# Patient Record
Sex: MALE | Race: WHITE | NOT HISPANIC OR LATINO | Employment: FULL TIME | URBAN - METROPOLITAN AREA
[De-identification: names, ages, dates, MRNs, and addresses within clinical notes are randomized per-mention and may not be internally consistent; named-entity substitution may affect disease eponyms.]

---

## 2020-09-02 ENCOUNTER — OFFICE VISIT (OUTPATIENT)
Dept: OBGYN CLINIC | Facility: CLINIC | Age: 30
End: 2020-09-02
Payer: COMMERCIAL

## 2020-09-02 ENCOUNTER — APPOINTMENT (OUTPATIENT)
Dept: RADIOLOGY | Facility: CLINIC | Age: 30
End: 2020-09-02
Payer: COMMERCIAL

## 2020-09-02 VITALS
WEIGHT: 224.6 LBS | SYSTOLIC BLOOD PRESSURE: 130 MMHG | HEIGHT: 68 IN | BODY MASS INDEX: 34.04 KG/M2 | TEMPERATURE: 96.9 F | DIASTOLIC BLOOD PRESSURE: 77 MMHG | HEART RATE: 83 BPM

## 2020-09-02 DIAGNOSIS — M25.511 RIGHT SHOULDER PAIN, UNSPECIFIED CHRONICITY: ICD-10-CM

## 2020-09-02 DIAGNOSIS — S46.011A ROTATOR CUFF STRAIN, RIGHT, INITIAL ENCOUNTER: Primary | ICD-10-CM

## 2020-09-02 PROCEDURE — 99204 OFFICE O/P NEW MOD 45 MIN: CPT | Performed by: ORTHOPAEDIC SURGERY

## 2020-09-02 PROCEDURE — 73030 X-RAY EXAM OF SHOULDER: CPT

## 2020-09-02 RX ORDER — IBUPROFEN 200 MG
TABLET ORAL EVERY 6 HOURS PRN
COMMUNITY
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
## 2020-09-15 ENCOUNTER — EVALUATION (OUTPATIENT)
Dept: PHYSICAL THERAPY | Facility: CLINIC | Age: 30
End: 2020-09-15
Payer: COMMERCIAL

## 2020-09-15 DIAGNOSIS — S46.011A ROTATOR CUFF STRAIN, RIGHT, INITIAL ENCOUNTER: ICD-10-CM

## 2020-09-15 PROCEDURE — 97161 PT EVAL LOW COMPLEX 20 MIN: CPT
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
## 2020-09-22 ENCOUNTER — OFFICE VISIT (OUTPATIENT)
Dept: PHYSICAL THERAPY | Facility: CLINIC | Age: 30
End: 2020-09-22
Payer: COMMERCIAL

## 2020-09-22 DIAGNOSIS — S46.011A ROTATOR CUFF STRAIN, RIGHT, INITIAL ENCOUNTER: Primary | ICD-10-CM

## 2020-09-22 PROCEDURE — 97110 THERAPEUTIC EXERCISES: CPT

## 2020-09-22 PROCEDURE — 97112 NEUROMUSCULAR REEDUCATION: CPT

## 2020-09-22 NOTE — PROGRESS NOTES
Daily Note      Today's date: 2020  Patient name: Sampson Suarez  : 1990  MRN: [id number]  Referring provider: Al Fraser MD  Dx:   Encounter Diagnosis     ICD-10-CM    1  Rotator cuff strain, right, initial encounter  S46 011A        Subjective: Pt reports that his right shoulder is improving  Pt states that at first he had slightly more pain with exercises  Pt states that after a while, his pain began to decrease  Objective: See treatment diary below    Assessment: Pt tolerated treatment well  Pt presented with good carryover of initial HEP and had no further questions  Pt presented with reported slight increase in discomfort with right shoulder flexion towel slide and no discomfort with abduction  Pt introduced to additional strengthening exercises including shoulder extensions, scaption, and shoulder abduction with theraband  Pt educated to include shoulder abduction, extension, and low doorway stretch in HEP  Pt also stated that he still has stiffness/discomfort in his right wrist from the fall and was educated in basic wrist flexibility and strengthening as HEP  Plan: Continue per plan of care  Progress treatment as tolerated  Precautions: Standard       EVAL 2 3 4 5   Manuals 9/15 9/22      STM/MFR R shoulder        PROM R shoulder         Joint mobs R shoulder        Neuro Re-Ed        Rows Instructed blue 3x10 (5s) green tubing      Shoulder extensions  2x10 (5s) green tubing       B/L ER Instructed green  2x10 (5s) green       Scaption   3x10 3#                              Ther Ex        Wall slides flex, abd Instructed 15x5s ea         Shoulder flexion wall slide w/ B/L ER iso   20x5s red       Low doorway stretch  5x15s       Shoulder abd w/ TB  20x3s black tubing                                       Ther Activity        Pt education POC, HEP Updated HEP                                                       Modalities

## 2020-09-24 ENCOUNTER — OFFICE VISIT (OUTPATIENT)
Dept: PHYSICAL THERAPY | Facility: CLINIC | Age: 30
End: 2020-09-24
Payer: COMMERCIAL

## 2020-09-24 DIAGNOSIS — S46.011A ROTATOR CUFF STRAIN, RIGHT, INITIAL ENCOUNTER: Primary | ICD-10-CM

## 2020-09-24 PROCEDURE — 97140 MANUAL THERAPY 1/> REGIONS: CPT

## 2020-09-24 PROCEDURE — 97110 THERAPEUTIC EXERCISES: CPT

## 2020-09-24 PROCEDURE — 97112 NEUROMUSCULAR REEDUCATION: CPT

## 2020-09-24 NOTE — PROGRESS NOTES
Daily Note      Today's date: 2020  Patient name: Richmond Beckford  : 1990  MRN: [id number]  Referring provider: Frandy Leos MD  Dx:   Encounter Diagnosis     ICD-10-CM    1  Rotator cuff strain, right, initial encounter  S46 011A        Subjective: Pt reports that he had an increase in right shoulder pain while working  Pt states while holding onto a stable object he sneezed and felt a sudden increase in pain  Objective: See treatment diary below    Assessment: Pt tolerated treatment fair  Pt noted mild increase in right shoulder pain with shoulder flexion wall slides and moderate increase with bilateral horizontal abduction, which was introduced in today's session  Pt also introduced to alternating isos IR/ER with PT and tolerated well  Pt noted pain in R shoulder with supine shoulder flexion AROM  Educated pt on potential benefits and purpose of HVLAT mobilization to thoracic spine  Obtained pt consent prior to performing mobilization  Pt noted no pain during shoulder flexion AROM in supine afterward  Plan: Continue per plan of care  Progress treatment as tolerated  Precautions: Standard       EVAL 2 3 4 5   Manuals 9/15 9/22 9/24     STM/MFR R shoulder        PROM R shoulder    In all planes      Joint mobs R shoulder   Gr V mid thoracic P-A, gr III-IV GH post, inf glide     Neuro Re-Ed        Rows Instructed blue 3x10 (5s) green tubing 2x10 (5s) green tubing      Shoulder extensions  2x10 (5s) green tubing  2x10 (5s) green tubing      B/L ER Instructed green  2x10 (5s) green  2x10 (5s) green      B/L horizontal abd   2x10 (3s)  green     Scaption   3x10 3# 2x10 black single tubing     Alternating isos ER/IR    At 45° abd  10x ea  Ther Ex        Wall slides flex, abd Instructed 15x5s ea  15x5s ea        Shoulder flexion wall slide w/ B/L ER iso   20x5s red  20x5s red     Low doorway stretch  5x15s  5x15s     Shoulder abd w/ TB  20x3s black tubing 20x3s black tubing                                      Ther Activity        Pt education POC, HEP Updated HEP  Reviewed HEP                                                     Modalities

## 2020-09-28 ENCOUNTER — OFFICE VISIT (OUTPATIENT)
Dept: PHYSICAL THERAPY | Facility: CLINIC | Age: 30
End: 2020-09-28
Payer: COMMERCIAL

## 2020-09-28 DIAGNOSIS — S46.011A ROTATOR CUFF STRAIN, RIGHT, INITIAL ENCOUNTER: Primary | ICD-10-CM

## 2020-09-28 PROCEDURE — 97112 NEUROMUSCULAR REEDUCATION: CPT

## 2020-09-28 PROCEDURE — 97110 THERAPEUTIC EXERCISES: CPT

## 2020-09-28 PROCEDURE — 97140 MANUAL THERAPY 1/> REGIONS: CPT

## 2020-09-28 NOTE — PROGRESS NOTES
Daily Note      Today's date: 2020  Patient name: Yassine Kan  : 1990  MRN: [id number]  Referring provider: Obed Shirley MD  Dx:   Encounter Diagnosis     ICD-10-CM    1  Rotator cuff strain, right, initial encounter  S46 011A        Subjective: Pt reports that over this past weekend his right shoulder has felt much better  Pt states that he also had 3 consecutive days off from work  Pt states he would like to continue PT for about one more week after this week in order to continue exercises and progress toward back to "normal" prior to shoulder injury  Objective: See treatment diary below    Assessment: Pt tolerated treatment well  Pt previously benefitted from grade 5 thoracic P-A mobilization to improve R shoulder AROM and pain  Pt was introduced to seated thoracic extension and noted relief of upper back stiffness  Pt educated in the correlation between upper back mobility and R shoulder mobility  Pt also progressed to shoulder IR/ER at 90° abduction and tolerated well  Pt would benefit from continued PT in order to compile comprehensive HEP  Discharge within ~3-4 visits is a realistic goal barring any sudden change in R shoulder pain/function  Plan: Continue per plan of care  Progress treatment as tolerated  Precautions: Standard       EVAL 2 3 4 5   Manuals 9/15 9/22 9/24 9/28    STM/MFR R shoulder        PROM R shoulder    In all planes  In all planes    Joint mobs R shoulder   Gr V mid thoracic P-A, gr III-IV GH post, inf glide     Neuro Re-Ed        Rows Instructed blue 3x10 (5s) green tubing 2x10 (5s) green tubing  20x5s black TB    Shoulder extensions  2x10 (5s) green tubing  2x10 (5s) green tubing  20x5s black    B/L ER Instructed green  2x10 (5s) green  2x10 (5s) green  20x3s green    B/L horizontal abd   2x10 (3s)  green 20x3s green    Scaption   3x10 3# 2x10 black single tubing 2x10 5#    Alternating isos ER/IR    At 45° abd  10x ea    Shoulder IR/ER at 90° RTB 20x ea  Ther Ex        Wall slides flex, abd Instructed 15x5s ea  15x5s ea  15x5s ea       Shoulder flexion wall slide w/ B/L ER iso   20x5s red  20x5s red     Low doorway stretch  5x15s  5x15s 5x15s     Shoulder abd w/ TB  20x3s black tubing  20x3s black tubing  20x3s black TB     Seated thoracic ext     20x w/ FR                            Ther Activity        Pt education POC, HEP Updated HEP  Reviewed HEP Progressing toward D/C                                                    Modalities

## 2020-09-30 ENCOUNTER — OFFICE VISIT (OUTPATIENT)
Dept: PHYSICAL THERAPY | Facility: CLINIC | Age: 30
End: 2020-09-30
Payer: COMMERCIAL

## 2020-09-30 DIAGNOSIS — S46.011A ROTATOR CUFF STRAIN, RIGHT, INITIAL ENCOUNTER: Primary | ICD-10-CM

## 2020-09-30 PROCEDURE — 97112 NEUROMUSCULAR REEDUCATION: CPT

## 2020-09-30 PROCEDURE — 97140 MANUAL THERAPY 1/> REGIONS: CPT

## 2020-09-30 PROCEDURE — 97110 THERAPEUTIC EXERCISES: CPT

## 2020-09-30 NOTE — PROGRESS NOTES
Daily Note     Today's date: 2020  Patient name: Day Wood  : 1990  MRN: [id number]  Referring provider: Singh Keita MD  Dx:   Encounter Diagnosis     ICD-10-CM    1  Rotator cuff strain, right, initial encounter  S46 011A        Start Time: 1740  Stop Time:   Total time in clinic (min): 45 minutes    Subjective: Patient states his right shldr feels pretty good today; occ has pain lissette with reaching overhead ( works as a  )       Objective: See treatment diary below      Assessment: Tolerated treatment well  Performs therex with good form; denied pain with strengthening exercises including scaption w 5# & abduction w black tband; patient reported  discomfort with doorway stretch - cueing to avoid overstretch  Patient would benefit from continued PT      Plan: Continue per plan of care  Patient to trial ice at home      Precautions: Standard       EVAL 2 3 4 5   Manuals 9/15 9/22 9/24 9/28    STM/MFR R shoulder        PROM R shoulder    In all planes  In all planes In all planes    Joint mobs R shoulder   Gr V mid thoracic P-A, gr III-IV GH post, inf glide  Not performed    Neuro Re-Ed        Rows Instructed blue 3x10 (5s) green tubing 2x10 (5s) green tubing  20x5s black TB 20x 5s black    Shoulder extensions  2x10 (5s) green tubing  2x10 (5s) green tubing  20x5s black 20x5s black    B/L ER Instructed green  2x10 (5s) green  2x10 (5s) green  20x3s green 2x10 3s  green   B/L horizontal abd   2x10 (3s)  green 20x3s green 20 x 3s green    Scaption   3x10 3# 2x10 black single tubing 2x10 5# 2 x 10 5#    Alternating isos ER/IR    At 45° abd  10x ea  Shoulder IR/ER at 90° RTB 20x ea  shldr IR/ER @ 90 RTB 20x ea                    Ther Ex        Wall slides flex, abd Instructed 15x5s ea  15x5s ea  15x5s ea    15 x 5s ea   Shoulder flexion wall slide w/ B/L ER iso   20x5s red  20x5s red     Low doorway stretch  5x15s  5x15s 5x15s  5 x 15 s    Shoulder abd w/ TB  20x3s black tubing  20x3s black tubing  20x3s black TB  20 x 3s black    Seated thoracic ext     20x w/ FR 20x w FR                            Ther Activity        Pt education POC, HEP Updated HEP  Reviewed HEP Progressing toward D/C Progressing toward D/C per primary PT                                                 Trial ice at home    Modalities

## 2020-10-06 ENCOUNTER — OFFICE VISIT (OUTPATIENT)
Dept: PHYSICAL THERAPY | Facility: CLINIC | Age: 30
End: 2020-10-06
Payer: COMMERCIAL

## 2020-10-06 DIAGNOSIS — S46.011A ROTATOR CUFF STRAIN, RIGHT, INITIAL ENCOUNTER: Primary | ICD-10-CM

## 2020-10-06 PROCEDURE — 97110 THERAPEUTIC EXERCISES: CPT

## 2020-10-06 PROCEDURE — 97140 MANUAL THERAPY 1/> REGIONS: CPT

## 2020-10-08 ENCOUNTER — OFFICE VISIT (OUTPATIENT)
Dept: PHYSICAL THERAPY | Facility: CLINIC | Age: 30
End: 2020-10-08
Payer: COMMERCIAL

## 2020-10-08 DIAGNOSIS — S46.011A ROTATOR CUFF STRAIN, RIGHT, INITIAL ENCOUNTER: Primary | ICD-10-CM

## 2020-10-08 PROCEDURE — 97110 THERAPEUTIC EXERCISES: CPT

## 2020-10-08 PROCEDURE — 97112 NEUROMUSCULAR REEDUCATION: CPT
